# Patient Record
Sex: FEMALE | Race: WHITE | Employment: FULL TIME | URBAN - METROPOLITAN AREA
[De-identification: names, ages, dates, MRNs, and addresses within clinical notes are randomized per-mention and may not be internally consistent; named-entity substitution may affect disease eponyms.]

---

## 2017-01-05 ENCOUNTER — OFFICE VISIT (OUTPATIENT)
Dept: PHYSICAL THERAPY | Age: 57
End: 2017-01-05
Attending: FAMILY MEDICINE
Payer: COMMERCIAL

## 2017-01-05 ENCOUNTER — OFFICE VISIT (OUTPATIENT)
Dept: NUTRITION/OBESITY MEDICINE | Facility: HOSPITAL | Age: 57
End: 2017-01-05
Attending: INTERNAL MEDICINE

## 2017-01-05 VITALS
BODY MASS INDEX: 41.2 KG/M2 | DIASTOLIC BLOOD PRESSURE: 71 MMHG | WEIGHT: 247.31 LBS | HEART RATE: 99 BPM | RESPIRATION RATE: 18 BRPM | OXYGEN SATURATION: 94 % | HEIGHT: 64.8 IN | SYSTOLIC BLOOD PRESSURE: 154 MMHG

## 2017-01-05 DIAGNOSIS — E55.9 VITAMIN D DEFICIENCY: ICD-10-CM

## 2017-01-05 DIAGNOSIS — E66.01 MORBID OBESITY WITH BMI OF 45.0-49.9, ADULT (HCC): ICD-10-CM

## 2017-01-05 DIAGNOSIS — R06.83 SNORING: ICD-10-CM

## 2017-01-05 DIAGNOSIS — R73.03 PRE-DIABETES: ICD-10-CM

## 2017-01-05 DIAGNOSIS — M16.11 OSTEOARTHRITIS OF RIGHT HIP: Primary | ICD-10-CM

## 2017-01-05 DIAGNOSIS — Z51.81 ENCOUNTER FOR THERAPEUTIC DRUG MONITORING: ICD-10-CM

## 2017-01-05 DIAGNOSIS — R63.2 BINGE EATING: ICD-10-CM

## 2017-01-05 DIAGNOSIS — I10 HTN (HYPERTENSION), BENIGN: Primary | ICD-10-CM

## 2017-01-05 PROCEDURE — 99214 OFFICE O/P EST MOD 30 MIN: CPT | Performed by: INTERNAL MEDICINE

## 2017-01-05 PROCEDURE — 97110 THERAPEUTIC EXERCISES: CPT

## 2017-01-05 PROCEDURE — 97163 PT EVAL HIGH COMPLEX 45 MIN: CPT

## 2017-01-05 RX ORDER — POTASSIUM CHLORIDE 750 MG/1
10 TABLET, FILM COATED, EXTENDED RELEASE ORAL
Refills: 4 | COMMUNITY
Start: 2016-12-15 | End: 2019-10-24

## 2017-01-05 RX ORDER — HYDROCHLOROTHIAZIDE 25 MG/1
25 TABLET ORAL DAILY
Qty: 30 TABLET | Refills: 1 | Status: SHIPPED | OUTPATIENT
Start: 2017-01-05

## 2017-01-05 NOTE — PROGRESS NOTES
[facility]  300 [facility] BAR[facility]ATRIC AND [facility]INIC  [address] [place] [address]  Dept: [phone]     Date:   2016    Patient:  Sangeetha Nation  :      10/10/1960  MRN:      [id number]    Chief Complaint:  Patient presents with:  Zeus Davenport MG Oral Cap Take 1 capsule (100 mg total) by mouth 2 (two) times daily. Disp: 60 capsule Rfl: 0   Ferrous Sulfate 325 (65 FE) MG Oral Tab Take 1 tablet (325 mg total) by mouth daily.  Disp: 30 tablet Rfl: 0   rivaroxaban 10 MG Oral Tab Take 1 tablet (10 mg · Patient has a Food Journal?: yes   · Patient is reading nutrition labels? yes  · Average Caloric Intake:     · Average CHO Intake: 100  · Is patient exercising? no  · Type of exercise?      Eating Habits  · Patient states the following:  · Eats 3 meal( normal. No rashes or lesions   Back: limited movement due to pain    ASSESSMENT     HYPERTENSION:  The patient's blood pressure has been well controlled. she has been checking it as instructed and has remained in relatively good control.       Encounter Ida Fontenot MD

## 2017-01-06 NOTE — PROGRESS NOTES
POST-OP HIP EVALUATION:   Referring Physician: Dr. Adams Current  Diagnosis: Osteoarthritis of right hip (M16.11) s/p R TRENT  12/19/16 Date of Service: 1/5/2017     PATIENT SUMMARY   Teodoro Rhoades is a 55year old y/o female who presents to therapy today s/p R TRENT w to light touch. Leg length: R: 94 cm L: 93.25 cm    AROM:   Hip   Flexion: R 90 deg; L WNL  Abduction: R 30 deg;     PROM:   Hip   Flexion: R NT >90 deg due to precautions;    Abduction: R 35 deg;      Flexibility:   Hamstrings: R NT;   Piriformis: R NT of care. Thank you for your referral. Please co-sign or sign and return this letter via fax as soon as possible to [phone].  If you have any questions, please contact me at Dept: [phone]    Sincerely,  Electronically signed by therapist: Karina Can,

## 2017-01-09 ENCOUNTER — OFFICE VISIT (OUTPATIENT)
Dept: PHYSICAL THERAPY | Age: 57
End: 2017-01-09
Attending: FAMILY MEDICINE
Payer: COMMERCIAL

## 2017-01-09 PROCEDURE — 97110 THERAPEUTIC EXERCISES: CPT

## 2017-01-09 NOTE — PROGRESS NOTES
Dx: Osteoarthritis of right hip (M16.11) s/p R TRENT  12/19/16          Authorized # of Visits:           Next MD visit: none scheduled  Fall Risk: standard         Precautions: Anterior hip precautions             Subjective: No new problems.   Reports that TRX: squat x10         Shuttle leg press:   bilat LE   5 bands 3x10    R SL   4 bands  3x10                                    CP x 10 min             Charges: Ex 3       Total Timed Treatment: 45 min  Total Treatment Time: 45 min

## 2017-01-12 ENCOUNTER — APPOINTMENT (OUTPATIENT)
Dept: PHYSICAL THERAPY | Age: 57
End: 2017-01-12
Attending: FAMILY MEDICINE
Payer: COMMERCIAL

## 2017-01-13 ENCOUNTER — OFFICE VISIT (OUTPATIENT)
Dept: PHYSICAL THERAPY | Age: 57
End: 2017-01-13
Attending: FAMILY MEDICINE
Payer: COMMERCIAL

## 2017-01-13 PROCEDURE — 97110 THERAPEUTIC EXERCISES: CPT

## 2017-01-13 NOTE — PROGRESS NOTES
Dx: Osteoarthritis of right hip (M16.11) s/p R TRENT  12/19/16          Authorized # of Visits:           Next MD visit: none scheduled  Fall Risk: standard         Precautions: Anterior hip precautions             Subjective: No new problems.   Reports muscl Tiltboard AP/PA, M/L x10 ea Tiltboard AP/PA, M/L x10 ea        R SLB 6 sec max R SLB 12 sec max        Lateral walking YTB 2x35 ft Lateral walking YTB 2x35 ft        Sit<> stand x10 Sit<> stand x10        TRX: squat x10 TRX: squat x10        Shuttle leg

## 2017-01-16 ENCOUNTER — OFFICE VISIT (OUTPATIENT)
Dept: PHYSICAL THERAPY | Age: 57
End: 2017-01-16
Attending: FAMILY MEDICINE
Payer: COMMERCIAL

## 2017-01-16 PROCEDURE — 97110 THERAPEUTIC EXERCISES: CPT

## 2017-01-16 NOTE — PROGRESS NOTES
Dx: Osteoarthritis of right hip (M16.11) s/p R TRENT  12/19/16          Authorized # of Visits:           Next MD visit: 2/3/17  Fall Risk: standard         Precautions: Anterior hip precautions             Subjective: No new problems.   Reports that she felt LSU R LE lead 6\" step x15 ea ASU, LSU R LE lead 6\" step 2x10 ea       Standing SLR with YTB into flex, abd x10 ea Standing SLR with YTB into flex, abd x10 ea Standing SLR with RTB into flex, abd x10 ea       Tiltboard AP/PA, M/L x10 ea Tiltboard AP/PA, M

## 2017-01-19 ENCOUNTER — OFFICE VISIT (OUTPATIENT)
Dept: PHYSICAL THERAPY | Age: 57
End: 2017-01-19
Attending: FAMILY MEDICINE
Payer: COMMERCIAL

## 2017-01-19 PROCEDURE — 97110 THERAPEUTIC EXERCISES: CPT

## 2017-01-19 NOTE — PROGRESS NOTES
Dx: Osteoarthritis of right hip (M16.11) s/p R TRENT  12/19/16          Authorized # of Visits:           Next MD visit: 2/3/17  Fall Risk: standard         Precautions: Anterior hip precautions             Subjective: No new problems.   Reports she continues minisquats x15 minisquats x15      ASU, LSU R LE lead 6\" step x10 ea ASU, LSU R LE lead 6\" step x15 ea ASU, LSU R LE lead 6\" step 2x10 ea ASU, LSU R LE lead 8\" step 2x10 ea      Standing SLR with YTB into flex, abd x10 ea Standing SLR with YTB into fle

## 2017-01-23 ENCOUNTER — APPOINTMENT (OUTPATIENT)
Dept: PHYSICAL THERAPY | Age: 57
End: 2017-01-23
Attending: FAMILY MEDICINE
Payer: COMMERCIAL

## 2017-01-24 ENCOUNTER — OFFICE VISIT (OUTPATIENT)
Dept: PHYSICAL THERAPY | Age: 57
End: 2017-01-24
Attending: FAMILY MEDICINE
Payer: COMMERCIAL

## 2017-01-24 PROCEDURE — 97110 THERAPEUTIC EXERCISES: CPT

## 2017-01-24 NOTE — PROGRESS NOTES
Dx: Osteoarthritis of right hip (M16.11) s/p R TRENT  12/19/16          Authorized # of Visits:           Next MD visit: 2/3/17  Fall Risk: standard         Precautions: Anterior hip precautions             Subjective: No new problems.   Reports she continues bilat gastroc stretches 3x30 sec     bilat HR x15 bilat HR x20 bilat HR x20 bilat HR x20 bilat HR x20     minisquats x10 minisquats x12 minisquats x15 minisquats x15 minisquats  2x10     ASU, LSU R LE lead 6\" step x10 ea ASU, LSU R LE lead 6\" step x15 ea

## 2017-01-26 ENCOUNTER — OFFICE VISIT (OUTPATIENT)
Dept: PHYSICAL THERAPY | Age: 57
End: 2017-01-26
Attending: FAMILY MEDICINE
Payer: COMMERCIAL

## 2017-01-26 PROCEDURE — 97110 THERAPEUTIC EXERCISES: CPT

## 2017-01-26 NOTE — PROGRESS NOTES
Dx: Osteoarthritis of right hip (M16.11) s/p R TRENT  12/19/16          Authorized # of Visits:           Next MD visit: 2/3/17  Fall Risk: standard         Precautions: Anterior hip precautions             Subjective: No new problems.   Reports she continues bilat HR x20 bilat HR x20 bilat HR x20 bilat HR x20 bilat HR x20    minisquats x10 minisquats x12 minisquats x15 minisquats x15 minisquats  2x10 minisquats  2x10    ASU, LSU R LE lead 6\" step x10 ea ASU, LSU R LE lead 6\" step x15 ea ASU, LSU R LE lead 6\

## 2017-01-30 ENCOUNTER — OFFICE VISIT (OUTPATIENT)
Dept: PHYSICAL THERAPY | Age: 57
End: 2017-01-30
Attending: FAMILY MEDICINE
Payer: COMMERCIAL

## 2017-01-30 PROCEDURE — 97110 THERAPEUTIC EXERCISES: CPT

## 2017-01-30 NOTE — PROGRESS NOTES
Dx: Osteoarthritis of right hip (M16.11) s/p R TRENT  12/19/16          Authorized # of Visits:           Next MD visit: 2/3/17  Fall Risk: standard         Precautions: Anterior hip precautions             Subjective: No new problems.   Reports she continues R SLB 18 sec max   bilat gastroc stretches 3x30 sec bilat gastroc stretches 3x30 sec bilat gastroc stretches 3x30 sec bilat gastroc stretches 3x30 sec bilat gastroc stretches 3x30 sec bilat gastroc stretches 3x30 sec bilat gastroc stretches 3x30 sec   bila 3x10    R SL   3 bands  3x10 Shuttle leg press:   bilat LE   6 bands 3x10    R SL   3 bands  3x10 Shuttle leg press:   bilat LE   6 bands 3x10    R SL   4 bands  3x10 Shuttle leg press:   bilat LE   6 bands 3x10    R SL   4 bands  3x10 Shuttle leg press:

## 2017-02-02 ENCOUNTER — OFFICE VISIT (OUTPATIENT)
Dept: PHYSICAL THERAPY | Age: 57
End: 2017-02-02
Attending: FAMILY MEDICINE
Payer: COMMERCIAL

## 2017-02-02 PROCEDURE — 97110 THERAPEUTIC EXERCISES: CPT

## 2017-02-02 NOTE — PROGRESS NOTES
Discharge Summary    Pt has attended 9, cancelled 0, and no shown 0 visits in Physical Therapy. Caroline Bai reports feeling 100% improvement in pain and function since surgery and starting therapy. She demonstrates WNL strength in her right hip.  She reports ab 1/13/2017  Tx#: 3/ Date: 1/16/2017  Tx#: 4/ Date: 1/19/2017  Tx#: 5/ Date: 1/24/2017  Tx#: 6/ Date: 1/26/2017  Tx#: 7/ Date: 1/30/2017  Tx#: 8/ Date: 2/2/2017  Tx# 9   Nu step L5 x6 min Nu step L6 x8 min Nu step L7 x8 min Nu step L7 x8 min Nu step L7 x10 m RTB 2x35 ft Lateral walking RTB 2x35 ft Lateral walking, monster walking RTB 2x35 ft Lateral walking, monster walking RTB 2x35 ft Lateral walking, monster walking RTB 2x35 ft   Sit<> stand x10 Sit<> stand x10 Sit<> stand x15 Sit<> stand x15 Sit<> stand 2x1

## 2017-03-16 ENCOUNTER — APPOINTMENT (OUTPATIENT)
Dept: SURGERY | Facility: CLINIC | Age: 57
End: 2017-03-16
Attending: INTERNAL MEDICINE

## 2017-05-06 ENCOUNTER — LAB ENCOUNTER (OUTPATIENT)
Dept: LAB | Facility: HOSPITAL | Age: 57
End: 2017-05-06
Attending: FAMILY MEDICINE
Payer: COMMERCIAL

## 2017-05-06 DIAGNOSIS — R79.9 ABNORMAL BLOOD CHEMISTRY: ICD-10-CM

## 2017-05-06 DIAGNOSIS — I10 ESSENTIAL HYPERTENSION, MALIGNANT: Primary | ICD-10-CM

## 2017-05-06 DIAGNOSIS — R31.21 ASYMPTOMATIC MICROSCOPIC HEMATURIA: ICD-10-CM

## 2017-05-06 PROCEDURE — 80048 BASIC METABOLIC PNL TOTAL CA: CPT

## 2017-05-06 PROCEDURE — 36415 COLL VENOUS BLD VENIPUNCTURE: CPT

## 2017-05-06 PROCEDURE — 81001 URINALYSIS AUTO W/SCOPE: CPT

## 2017-05-06 PROCEDURE — 83036 HEMOGLOBIN GLYCOSYLATED A1C: CPT

## 2018-03-14 ENCOUNTER — HOSPITAL ENCOUNTER (OUTPATIENT)
Age: 58
Discharge: HOME | End: 2018-03-14
Payer: COMMERCIAL

## 2018-03-14 VITALS
SYSTOLIC BLOOD PRESSURE: 189 MMHG | HEIGHT: 65 IN | OXYGEN SATURATION: 98 % | DIASTOLIC BLOOD PRESSURE: 73 MMHG | BODY MASS INDEX: 43.99 KG/M2 | RESPIRATION RATE: 16 BRPM | WEIGHT: 264 LBS | TEMPERATURE: 98 F | HEART RATE: 83 BPM

## 2018-03-14 DIAGNOSIS — R68.89 FLU-LIKE SYMPTOMS: Primary | ICD-10-CM

## 2018-03-14 PROCEDURE — 99213 OFFICE O/P EST LOW 20 MIN: CPT

## 2018-03-14 PROCEDURE — 99212 OFFICE O/P EST SF 10 MIN: CPT

## 2018-03-14 NOTE — ED INITIAL ASSESSMENT (HPI)
Pt with c/o cough and congestion. Pt with body aches and fatigue. Unknown fevers. Started yesterday. Pt states family at home dx with flu.   Pt didn't take bp meds today

## 2018-03-14 NOTE — ED PROVIDER NOTES
Roberto Boles is a 65year old female who presents with for chief complaint of fever, chills, nasal congestion, coryza, rhinorrhea, sore throat, cough, headache, myalgias, fatigue, malaise, tiredness along with nausea,  Onset of symptoms was yesterday.   Ab and tongue normal; teeth and gums normal. Buccal mucosa moist.   Neck: no adenopathy  Lungs: clear to auscultation bilaterally. No chest wall retractions. No respiratory distress.  No tachypnea noted  Heart: S1, S2 normal, no murmur, click, rub or gallop, r

## 2018-06-30 ENCOUNTER — OFFICE VISIT (OUTPATIENT)
Dept: FAMILY MEDICINE CLINIC | Facility: CLINIC | Age: 58
End: 2018-06-30

## 2018-06-30 VITALS
OXYGEN SATURATION: 98 % | RESPIRATION RATE: 14 BRPM | BODY MASS INDEX: 40.98 KG/M2 | SYSTOLIC BLOOD PRESSURE: 138 MMHG | HEART RATE: 92 BPM | WEIGHT: 255 LBS | DIASTOLIC BLOOD PRESSURE: 79 MMHG | TEMPERATURE: 100 F | HEIGHT: 66 IN

## 2018-06-30 DIAGNOSIS — J01.90 ACUTE SINUSITIS, RECURRENCE NOT SPECIFIED, UNSPECIFIED LOCATION: Primary | ICD-10-CM

## 2018-06-30 DIAGNOSIS — R05.9 COUGH: ICD-10-CM

## 2018-06-30 PROCEDURE — 99213 OFFICE O/P EST LOW 20 MIN: CPT | Performed by: NURSE PRACTITIONER

## 2018-06-30 RX ORDER — AZITHROMYCIN 250 MG/1
TABLET, FILM COATED ORAL
Qty: 6 TABLET | Refills: 0 | Status: SHIPPED | OUTPATIENT
Start: 2018-06-30 | End: 2018-11-20

## 2018-06-30 NOTE — PROGRESS NOTES
CHIEF COMPLAINT:   Patient presents with:  URI      HPI:   Kalie Lucas is a 65year old female who presents with URI symptoms for 3-4 days. Onset was quick. Symptoms are progressing.  Location is reported as mid face and upper chest. Description is report • Lower extremity edema    • Morbid obesity with BMI of 45.0-49.9, adult (HCC)    • Osteoarthritis    • PONV (postoperative nausea and vomiting)     n&v with pain meds   • Pre-diabetes    • [facility]   • Visual impairment     glasses /79   Pulse 92   Temp 100.4 °F (38 °C)   Resp 14   Ht 66\"   Wt 255 lb   SpO2 98%   BMI 41.16 kg/m²   GENERAL: well developed, well nourished,in no apparent distress  SKIN: no rashes,no suspicious lesions  HEAD: atraumatic, normocephalic, mild tender Signed Prescriptions Disp Refills    azithromycin 250 MG Oral Tab 6 tablet 0      Sig: Take 2 tabs PO on Day 1, then 1 tab PO daily for days 2 through 5,         Risks, benefits, side effects of medication addressed and explained to patient.   Discussed wit The doctor may take a sample of mucus to check for bacteria. If you have sinusitis that keeps coming back, you may need imaging tests such as X-rays or CAT scans. This will help your doctor check for a structural problem that may be causing the infection. · To breathe out, open your mouth and make a “england” sound in your throat. (This is the same way you might breathe to clean a pair of eyeglasses.)  · [place] 2 to 3 times as you breathe out. · Relax for a few seconds. Repeat the steps as needed.   Follow-up c

## 2018-06-30 NOTE — PATIENT INSTRUCTIONS
Acute Sinusitis    Acute sinusitis is irritation and swelling of the sinuses. It is usually caused by a viral infection after a common cold. Your doctor can help you find relief. What is acute sinusitis?   Sinuses are air-filled spaces in the skull behin © 7119-5236 [employer] [address]. [address] [facility], [facility] [place]. All rights reserved. This information is not intended as a substitute for professional medical care. Always follow your healthcare professional's instructions.         Yue © 7525-5323 [employer] [address]. [address] [place], [facility] [place]. All rights reserved. This information is not intended as a substitute for professional medical care. Always follow your healthcare professional's instructions.

## 2018-11-20 ENCOUNTER — HOSPITAL ENCOUNTER (OUTPATIENT)
Age: 58
Discharge: HOME | End: 2018-11-20
Attending: FAMILY MEDICINE
Payer: COMMERCIAL

## 2018-11-20 VITALS
WEIGHT: 235 LBS | HEART RATE: 82 BPM | SYSTOLIC BLOOD PRESSURE: 122 MMHG | BODY MASS INDEX: 39.15 KG/M2 | HEIGHT: 65 IN | OXYGEN SATURATION: 100 % | DIASTOLIC BLOOD PRESSURE: 70 MMHG | TEMPERATURE: 97 F | RESPIRATION RATE: 16 BRPM

## 2018-11-20 DIAGNOSIS — N61.0 CELLULITIS OF RIGHT BREAST: Primary | ICD-10-CM

## 2018-11-20 PROCEDURE — 99213 OFFICE O/P EST LOW 20 MIN: CPT

## 2018-11-20 PROCEDURE — 99214 OFFICE O/P EST MOD 30 MIN: CPT

## 2018-11-20 RX ORDER — SULFAMETHOXAZOLE AND TRIMETHOPRIM 800; 160 MG/1; MG/1
1 TABLET ORAL 2 TIMES DAILY
Qty: 14 TABLET | Refills: 0 | Status: SHIPPED | OUTPATIENT
Start: 2018-11-20 | End: 2018-11-27

## 2018-11-20 NOTE — ED PROVIDER NOTES
Patient Seen in: [address] [facility]    History   Patient presents with:  Rash Skin Problem (integumentary)    Stated Complaint: rt breast pain    HPI  64 yo F - non-compliant with her mammograms - last one was in 2011 and this was normal - n to presenting problem. Social History    Tobacco Use      Smoking status: Never Smoker      Smokeless tobacco: Never Used    Alcohol use:  Yes      Alcohol/week: 0.0 oz      Comment: 2 drinks per month    Drug use: No      Review of Systems    Positive f PMD TO SCHEDULE SCREENING MAMMOGRAM               Disposition and Plan     Clinical Impression:  Cellulitis of right breast  (primary encounter diagnosis)    Disposition:  Discharge  11/20/2018  3:06 pm    Follow-up:  Kelechi Sandoval DO  [address]

## 2018-11-20 NOTE — ED INITIAL ASSESSMENT (HPI)
Pt sts 2 weeks ago noted a red, slightly itchy, painless lump to right breast. Today the lump has \"ripped open and is draining bloody drng. \" Denies fever, or n/t to right arm.

## 2018-11-21 ENCOUNTER — HOSPITAL ENCOUNTER (OUTPATIENT)
Dept: ULTRASOUND IMAGING | Age: 58
Discharge: HOME | End: 2018-11-21
Attending: FAMILY MEDICINE
Payer: COMMERCIAL

## 2018-11-21 DIAGNOSIS — Z12.39 SPECIAL SCREENING EXAMINATION FOR NEOPLASM OF BREAST: ICD-10-CM

## 2018-11-21 PROCEDURE — 76642 ULTRASOUND BREAST LIMITED: CPT | Performed by: FAMILY MEDICINE

## 2018-12-07 ENCOUNTER — HOSPITAL ENCOUNTER (OUTPATIENT)
Dept: MAMMOGRAPHY | Age: 58
Discharge: HOME | End: 2018-12-07
Attending: FAMILY MEDICINE
Payer: COMMERCIAL

## 2018-12-07 ENCOUNTER — HOSPITAL ENCOUNTER (OUTPATIENT)
Dept: ULTRASOUND IMAGING | Age: 58
Discharge: HOME | End: 2018-12-07
Attending: FAMILY MEDICINE
Payer: COMMERCIAL

## 2018-12-07 DIAGNOSIS — R92.8 ABNORMAL FINDINGS ON DIAGNOSTIC IMAGING OF BREAST: ICD-10-CM

## 2018-12-07 PROCEDURE — 76642 ULTRASOUND BREAST LIMITED: CPT | Performed by: FAMILY MEDICINE

## 2018-12-07 PROCEDURE — 77066 DX MAMMO INCL CAD BI: CPT | Performed by: FAMILY MEDICINE

## 2018-12-07 PROCEDURE — 77062 BREAST TOMOSYNTHESIS BI: CPT | Performed by: FAMILY MEDICINE

## 2019-04-17 ENCOUNTER — OFFICE VISIT (OUTPATIENT)
Dept: FAMILY MEDICINE CLINIC | Facility: CLINIC | Age: 59
End: 2019-04-17
Payer: COMMERCIAL

## 2019-04-17 VITALS
HEART RATE: 92 BPM | DIASTOLIC BLOOD PRESSURE: 80 MMHG | RESPIRATION RATE: 16 BRPM | TEMPERATURE: 98 F | SYSTOLIC BLOOD PRESSURE: 150 MMHG | OXYGEN SATURATION: 99 %

## 2019-04-17 DIAGNOSIS — J01.90 ACUTE NON-RECURRENT SINUSITIS, UNSPECIFIED LOCATION: Primary | ICD-10-CM

## 2019-04-17 PROCEDURE — 99213 OFFICE O/P EST LOW 20 MIN: CPT | Performed by: PHYSICIAN ASSISTANT

## 2019-04-17 RX ORDER — FLUTICASONE PROPIONATE 50 MCG
2 SPRAY, SUSPENSION (ML) NASAL DAILY
Qty: 1 INHALER | Refills: 0 | Status: SHIPPED | OUTPATIENT
Start: 2019-04-17

## 2019-04-17 RX ORDER — AMOXICILLIN AND CLAVULANATE POTASSIUM 875; 125 MG/1; MG/1
1 TABLET, FILM COATED ORAL 2 TIMES DAILY
Qty: 20 TABLET | Refills: 0 | Status: SHIPPED | OUTPATIENT
Start: 2019-04-17 | End: 2019-04-27

## 2019-04-17 NOTE — PROGRESS NOTES
CHIEF COMPLAINT:   Patient presents with:  Sinus Problem      HPI:   Leandro Aldridge is a 63year old female who presents for sinus symptoms for  1  weeks. Symptoms have progressed and has been worsening.  Sinus congestion/pain is described as a pressure and Osteoarthritis    • PONV (postoperative nausea and vomiting)     n&v with pain meds   • Pre-diabetes    • [facility]   • Visual impairment     glasses   • Vitamin D deficiency       Past Surgical History:   Procedure Laterality Date   • exudates. No lesion on roof of mouth. NECK: supple, non-tender  LUNGS: clear to auscultation bilaterally, no wheezes or rhonchi. Breathing is non labored. CARDIO: S1S2 RRR   EXTREMITIES: no cyanosis, clubbing or edema  LYMPH:  No gross lymphadenopathy.

## 2019-08-14 ENCOUNTER — HOSPITAL ENCOUNTER (EMERGENCY)
Age: 59
Discharge: HOME | End: 2019-08-14
Attending: EMERGENCY MEDICINE
Payer: COMMERCIAL

## 2019-08-14 VITALS
RESPIRATION RATE: 18 BRPM | OXYGEN SATURATION: 98 % | DIASTOLIC BLOOD PRESSURE: 76 MMHG | BODY MASS INDEX: 44.15 KG/M2 | HEIGHT: 65 IN | HEART RATE: 96 BPM | WEIGHT: 265 LBS | SYSTOLIC BLOOD PRESSURE: 159 MMHG

## 2019-08-14 DIAGNOSIS — K64.9 HEMORRHOIDS, UNSPECIFIED HEMORRHOID TYPE: ICD-10-CM

## 2019-08-14 DIAGNOSIS — K59.00 CONSTIPATION, UNSPECIFIED CONSTIPATION TYPE: Primary | ICD-10-CM

## 2019-08-14 PROCEDURE — 82272 OCCULT BLD FECES 1-3 TESTS: CPT

## 2019-08-14 PROCEDURE — 99283 EMERGENCY DEPT VISIT LOW MDM: CPT

## 2019-08-14 RX ORDER — POLYETHYLENE GLYCOL 3350 17 G/17G
17 POWDER, FOR SOLUTION ORAL DAILY PRN
Qty: 12 EACH | Refills: 0 | Status: SHIPPED | OUTPATIENT
Start: 2019-08-14 | End: 2019-09-13

## 2019-08-14 RX ORDER — DOCUSATE SODIUM 100 MG/1
100 CAPSULE, LIQUID FILLED ORAL 2 TIMES DAILY
Qty: 60 CAPSULE | Refills: 0 | Status: SHIPPED | OUTPATIENT
Start: 2019-08-14 | End: 2019-09-13

## 2019-08-14 RX ORDER — POTASSIUM CHLORIDE 750 MG/1
10 TABLET, EXTENDED RELEASE ORAL DAILY
Refills: 3 | COMMUNITY
Start: 2019-04-08

## 2019-08-14 NOTE — ED INITIAL ASSESSMENT (HPI)
Pt to the ED for evaluation of constipation and possible hemorrhoid. Reports last BM Sunday. States she is unable to go due to the presence of the hemorrhoid. No OTC meds taken.

## 2019-08-14 NOTE — ED PROVIDER NOTES
Patient Seen in: [facility] [place]    History   Patient presents with:  Constipation (gastrointestinal)  Anal Problem (GI)    Stated Complaint: constipation, hemmorrhoids    HPI    The patient is a 60-year-old female with multiple replacement   • HIP TOTAL REPLACEMENT Right 12/19/2016    Performed by Bacilio Torres MD at [facility] MAIN OR   • HYSTERECTOMY  2011   • LUMBAR MICRODISCECTOMY 1 LEVEL N/A 8/26/2016    Performed by Troy Mora DO at [facility] MAIN OR   • TOTAL ABDOM HYSTERECTOMY [address], RN. There is a nontender, pink, viable external hemorrhoid noted that is mildly tender. No other external lesions. Normal rectal tone. There is a significant amount of hard brown Hemoccult negative stool within rectal vault.   Upon digital disi

## 2019-10-24 ENCOUNTER — OFFICE VISIT (OUTPATIENT)
Dept: FAMILY MEDICINE CLINIC | Facility: CLINIC | Age: 59
End: 2019-10-24
Payer: COMMERCIAL

## 2019-10-24 VITALS
TEMPERATURE: 98 F | BODY MASS INDEX: 44 KG/M2 | SYSTOLIC BLOOD PRESSURE: 138 MMHG | HEART RATE: 100 BPM | DIASTOLIC BLOOD PRESSURE: 74 MMHG | RESPIRATION RATE: 18 BRPM | OXYGEN SATURATION: 97 % | WEIGHT: 267 LBS

## 2019-10-24 DIAGNOSIS — J01.00 ACUTE MAXILLARY SINUSITIS, RECURRENCE NOT SPECIFIED: Primary | ICD-10-CM

## 2019-10-24 PROCEDURE — 99203 OFFICE O/P NEW LOW 30 MIN: CPT | Performed by: FAMILY MEDICINE

## 2019-10-24 RX ORDER — AMOXICILLIN AND CLAVULANATE POTASSIUM 875; 125 MG/1; MG/1
1 TABLET, FILM COATED ORAL 2 TIMES DAILY
Qty: 14 TABLET | Refills: 0 | Status: SHIPPED | OUTPATIENT
Start: 2019-10-24 | End: 2019-10-31

## 2019-10-24 NOTE — PROGRESS NOTES
CHIEF COMPLAINT: Patient presents with:  Sinus Problem        HPI:     Ten Crockett is a 63year old female presents for sinus problems x 3 days. Tashia Santiago is a 62 yo F with recurrent sinusitis p/w a 3-day history of sinus problems x 3 days.  She has increa Cancer Mother [de-identified]      Social History: Social History    Tobacco Use      Smoking status: Never Smoker      Smokeless tobacco: Never Used    Alcohol use:  Yes      Alcohol/week: 0.0 standard drinks      Comment: 2 drinks per month    Drug use: No       Medic Constitutional: She is oriented to person, place, and time. She appears well-developed and well-nourished. HENT:   Head: Normocephalic. Right Ear: Ear canal normal. Tympanic membrane is not erythematous.    Left Ear: Ear canal normal. Tympanic membran Cells 05/06/2017 Small    • Renal Tubular Epithelial* 05/06/2017 None Seen    • Transitional Cells 05/06/2017 None Seen    • Mucous Urine 05/06/2017 1+*   • Yeast Urine 05/06/2017 None Seen    • Glucose 05/06/2017 96    • BUN 05/06/2017 11    • Creatinine localized, lower leg     Tear of medial cartilage or meniscus of knee, current     Morbid obesity due to excess calories (HCC)     Elevated blood pressure     HTN (hypertension), benign     Fatigue     Snoring     Morbid obesity with BMI of 45.0-49.9, adul

## 2019-12-30 ENCOUNTER — HOSPITAL ENCOUNTER (OUTPATIENT)
Dept: MAMMOGRAPHY | Age: 59
Discharge: HOME | End: 2019-12-30
Attending: FAMILY MEDICINE
Payer: COMMERCIAL

## 2019-12-30 DIAGNOSIS — Z12.31 ENCOUNTER FOR SCREENING MAMMOGRAM FOR MALIGNANT NEOPLASM OF BREAST: ICD-10-CM

## 2019-12-30 PROCEDURE — 77067 SCR MAMMO BI INCL CAD: CPT | Performed by: FAMILY MEDICINE

## 2020-01-06 ENCOUNTER — HOSPITAL ENCOUNTER (OUTPATIENT)
Dept: MAMMOGRAPHY | Age: 60
Discharge: HOME | End: 2020-01-06
Attending: FAMILY MEDICINE
Payer: COMMERCIAL

## 2020-01-06 ENCOUNTER — HOSPITAL ENCOUNTER (OUTPATIENT)
Dept: ULTRASOUND IMAGING | Age: 60
Discharge: HOME | End: 2020-01-06
Attending: FAMILY MEDICINE
Payer: COMMERCIAL

## 2020-01-06 DIAGNOSIS — R92.2 INCONCLUSIVE MAMMOGRAM: ICD-10-CM

## 2020-01-06 PROCEDURE — 77062 BREAST TOMOSYNTHESIS BI: CPT | Performed by: FAMILY MEDICINE

## 2020-01-06 PROCEDURE — 77066 DX MAMMO INCL CAD BI: CPT | Performed by: FAMILY MEDICINE

## 2020-01-06 PROCEDURE — 76642 ULTRASOUND BREAST LIMITED: CPT | Performed by: FAMILY MEDICINE

## 2020-12-02 ENCOUNTER — TELEPHONE (OUTPATIENT)
Dept: INTERNAL MEDICINE CLINIC | Facility: HOSPITAL | Age: 60
End: 2020-12-02

## 2020-12-02 ENCOUNTER — LAB ENCOUNTER (OUTPATIENT)
Dept: LAB | Age: 60
End: 2020-12-02
Attending: PREVENTIVE MEDICINE

## 2020-12-02 DIAGNOSIS — Z20.822 SUSPECTED 2019 NOVEL CORONAVIRUS INFECTION: ICD-10-CM

## 2020-12-02 DIAGNOSIS — Z20.822 SUSPECTED 2019 NOVEL CORONAVIRUS INFECTION: Primary | ICD-10-CM

## 2020-12-02 NOTE — TELEPHONE ENCOUNTER
Results and RTW guidelines:    COVID RESULT GIVEN:      Test type:    [x] Rapid         []      [x] Positive   - Monitor sx and temperature    - Employee should quarantine at home for at least 10 days from sx onset, follow the CDC guidelines for c

## 2020-12-02 NOTE — TELEPHONE ENCOUNTER
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 [x] Yes    [] No   If yes, explain: 5 family memebers      NOTES: Tashia Santiago called stating the above s/s, she also states that 5 family members that reside in her home are Covid+ 12/1/2020.  Tashia Agent does state that she wo

## 2021-09-09 ENCOUNTER — TELEPHONE (OUTPATIENT)
Dept: INTERNAL MEDICINE CLINIC | Facility: HOSPITAL | Age: 61
End: 2021-09-09

## 2021-09-09 ENCOUNTER — HOSPITAL ENCOUNTER (OUTPATIENT)
Age: 61
Discharge: STILL ADMITTED OR ADMINISTRATIVE | End: 2021-09-09
Payer: COMMERCIAL

## 2021-09-09 ENCOUNTER — LAB ENCOUNTER (OUTPATIENT)
Dept: LAB | Age: 61
End: 2021-09-09
Attending: PREVENTIVE MEDICINE
Payer: COMMERCIAL

## 2021-09-09 ENCOUNTER — HOSPITAL ENCOUNTER (OUTPATIENT)
Age: 61
Discharge: HOME | End: 2021-09-09
Payer: COMMERCIAL

## 2021-09-09 VITALS
BODY MASS INDEX: 44.98 KG/M2 | TEMPERATURE: 98 F | HEART RATE: 69 BPM | OXYGEN SATURATION: 98 % | HEIGHT: 65 IN | RESPIRATION RATE: 17 BRPM | WEIGHT: 270 LBS | SYSTOLIC BLOOD PRESSURE: 151 MMHG | DIASTOLIC BLOOD PRESSURE: 65 MMHG

## 2021-09-09 DIAGNOSIS — U07.1 COVID: ICD-10-CM

## 2021-09-09 DIAGNOSIS — K08.89 PAIN, DENTAL: Primary | ICD-10-CM

## 2021-09-09 DIAGNOSIS — U07.1 COVID: Primary | ICD-10-CM

## 2021-09-09 LAB — SARS-COV-2 RNA RESP QL NAA+PROBE: NOT DETECTED

## 2021-09-09 PROCEDURE — 99213 OFFICE O/P EST LOW 20 MIN: CPT | Performed by: PHYSICIAN ASSISTANT

## 2021-09-09 RX ORDER — AMOXICILLIN 875 MG/1
875 TABLET, COATED ORAL 2 TIMES DAILY
Qty: 14 TABLET | Refills: 0 | Status: SHIPPED | OUTPATIENT
Start: 2021-09-09 | End: 2021-09-16

## 2021-09-09 NOTE — TELEPHONE ENCOUNTER
Results and RTW guidelines:    COVID RESULT:    [x] Viewed by employee in [address] RTW plan and instructions as indicated on triage call. Manager notified. Estimated RTW date: 9/9/21  [] Discussed with employee   [] Unable to reach by phone.   Sent via for new/worsening sx.   Discuss RTW guidelines with manager  [x] May continue to work  [] If test result is negative, return to work after 7 days from exposure date  [] Follow up with PCP  [] Home until further instruction from hotline with [facility] results

## 2021-09-09 NOTE — ED INITIAL ASSESSMENT (HPI)
Pt began with rt sided lower jaw/gum pain, it is now not going away and getting worse, and moving into her jaw, face and rt ear

## 2021-09-09 NOTE — ED PROVIDER NOTES
Patient Seen in: [facility] [address] [place] [address]      History   Patient presents with:  Dental Problem    Stated Complaint: pain in right jaw/ear/throat    HPI/Subjective:   HPI    52-year-old female presents to the [address] [place] [address] for evaluation of possible dental i [09/09/21 1032]   /65   Pulse 69   Resp 17   Temp 98.2 °F (36.8 °C)   Temp src Oral   SpO2 98 %   O2 Device None (Room air)       Current:/65   Pulse 69   Temp 98.2 °F (36.8 °C) (Oral)   Resp 17   Ht 165.1 cm (5' 5\")   Wt 122.5 kg   SpO2 98%

## 2021-09-09 NOTE — TELEPHONE ENCOUNTER
Department: Coorp- Coding                                 [x] [facility]  []HAROLDO   [] [facility]    Dep Manager/Supervisor/team or clinical lead: Elliot Drew    Position:  [] MD     [] RN     [] Respiratory Therapist     [] PCT     [] PSR      [x] Other     H seen as Pt for possible gum abscess. ICC instructed to call hotline for testing prior to being seen. Orders placed to expedite care in [facility] for pt. PLAN:     Is employee symptomatic? Yes-  [x] Order rapid testing.   Stay home until further instr scheduling at [phone] or use [employer] to make an appointment for their testing and remain in their vehicle when arrived at site until they are contacted for testing  [x]  May return to work if employee views negative result in [employer] and remains fever

## 2022-02-01 ENCOUNTER — NURSE ONLY (OUTPATIENT)
Dept: LAB | Age: 62
End: 2022-02-01
Attending: PREVENTIVE MEDICINE
Payer: COMMERCIAL

## 2022-02-01 ENCOUNTER — TELEPHONE (OUTPATIENT)
Dept: INTERNAL MEDICINE CLINIC | Facility: HOSPITAL | Age: 62
End: 2022-02-01

## 2022-02-01 DIAGNOSIS — Z20.822 SUSPECTED COVID-19 VIRUS INFECTION: ICD-10-CM

## 2022-02-01 LAB — SARS-COV-2 RNA RESP QL NAA+PROBE: NOT DETECTED

## 2022-02-01 NOTE — TELEPHONE ENCOUNTER
[x] [facility]  []HAROLDO   [] [facility]  Manager : Mark Thorne    HAVE YOU RECEIVED THE COVID-19 Vaccine? Yes [x]    No []          If yes, date(s) received:   4/29/21, 6/10/21, 1/14/22         Which vaccine:  Pfizer []     Corinne Chesterfield [x]    J&J []      SYMPTOMS (reported via dashboard): Runny nose  [] asymptomatic  [x] symptomatic  [] GI symptoms only  Symptom onset date: 1/31/22  Any fever, vomiting or diarrhea?:Yes []     No [x]    If yes describe:    Employee has positive COVID Exposure? Yes [x]     No []    Date of exposure:     []  Coworker                       [] patient                        [x] Family/friend    When was the last shift you worked?: 2/1/22 (works from home)    PLAN:  Maria Del Carmen's daughter tested positive on Saturday (1/29/22).  Positive exposure, mild symptoms    COVID-19 testing ordered: [x] Rapid    [] [facility]              Date test is to be taken:  2/1/22    []  Outside testing                           Notes:    INSTRUCTIONS PROVIDED:    [x]  Employee was instructed to call Central scheduling at [phone] or use [employer] to make an appointment for their testing and once at the site remain in their vehicle and call [phone] to register for the appointment  [x]  May return to work if employee views negative result in [address] and remains fever, vomiting, and diarrhea free  []  May continue to work if remains asymptomatic and views negative result in [address]  []  Follow up for condition update when resulting  []  If symptoms develop, stay home and call hotline for rapid test order  [x]  If COVID positive results, off work minimum of 5 days from positive test or onset of symptoms (day 0)    [x]      On day 5, if asymptomatic or mildly symptomatic (with improving symptoms) may return to work day 6  []      On day 5, if symptomatic, call Employee Health for RTW screening        [x]  Plan noted above  [x]  Length of time to obtain results  []  Quarantine instructions  [x]  S/S of worsening infection/condition and importance of prompt medical re-evaluation including when to seek emergency care.    [x] The employee voiced understanding

## 2022-10-21 ENCOUNTER — HOSPITAL ENCOUNTER (OUTPATIENT)
Dept: MAMMOGRAPHY | Age: 62
End: 2022-10-21
Attending: FAMILY MEDICINE
Payer: COMMERCIAL

## 2022-10-21 ENCOUNTER — HOSPITAL ENCOUNTER (OUTPATIENT)
Dept: MAMMOGRAPHY | Age: 62
Discharge: HOME | End: 2022-10-21
Attending: FAMILY MEDICINE
Payer: COMMERCIAL

## 2022-10-21 DIAGNOSIS — Z12.31 SCREENING MAMMOGRAM FOR BREAST CANCER: ICD-10-CM

## 2022-10-21 DIAGNOSIS — Z12.31 ENCOUNTER FOR SCREENING MAMMOGRAM FOR MALIGNANT NEOPLASM OF BREAST: ICD-10-CM

## 2022-10-21 PROCEDURE — 77067 SCR MAMMO BI INCL CAD: CPT | Performed by: FAMILY MEDICINE

## 2022-10-21 PROCEDURE — 77063 BREAST TOMOSYNTHESIS BI: CPT | Performed by: FAMILY MEDICINE

## 2023-11-30 ENCOUNTER — HOSPITAL ENCOUNTER (OUTPATIENT)
Age: 63
Discharge: HOME | End: 2023-11-30
Payer: COMMERCIAL

## 2023-11-30 VITALS
HEIGHT: 65 IN | BODY MASS INDEX: 44.98 KG/M2 | WEIGHT: 270 LBS | DIASTOLIC BLOOD PRESSURE: 98 MMHG | OXYGEN SATURATION: 97 % | SYSTOLIC BLOOD PRESSURE: 138 MMHG | TEMPERATURE: 98 F | HEART RATE: 95 BPM | RESPIRATION RATE: 18 BRPM

## 2023-11-30 DIAGNOSIS — U07.1 COVID: ICD-10-CM

## 2023-11-30 DIAGNOSIS — R05.9 COUGH: Primary | ICD-10-CM

## 2023-11-30 LAB
POCT INFLUENZA A: NEGATIVE
POCT INFLUENZA B: NEGATIVE
SARS-COV-2 RNA RESP QL NAA+PROBE: DETECTED

## 2023-11-30 PROCEDURE — 87502 INFLUENZA DNA AMP PROBE: CPT | Performed by: PHYSICIAN ASSISTANT

## 2023-11-30 PROCEDURE — 99213 OFFICE O/P EST LOW 20 MIN: CPT | Performed by: PHYSICIAN ASSISTANT

## 2023-11-30 PROCEDURE — U0002 COVID-19 LAB TEST NON-CDC: HCPCS | Performed by: PHYSICIAN ASSISTANT

## 2023-11-30 RX ORDER — FLUTICASONE PROPIONATE 50 MCG
2 SPRAY, SUSPENSION (ML) NASAL DAILY
Qty: 16 G | Refills: 0 | Status: SHIPPED | OUTPATIENT
Start: 2023-11-30 | End: 2023-12-30

## 2023-11-30 NOTE — ED INITIAL ASSESSMENT (HPI)
Patient started with cough and congestion- cough seems to be improving. Chills and ST in the AM. She started with symptoms Saturday.

## 2025-07-26 ENCOUNTER — HOSPITAL ENCOUNTER (OUTPATIENT)
Age: 65
Discharge: HOME | End: 2025-07-26
Payer: COMMERCIAL

## 2025-07-26 VITALS
BODY MASS INDEX: 46.65 KG/M2 | SYSTOLIC BLOOD PRESSURE: 180 MMHG | HEIGHT: 65 IN | TEMPERATURE: 99 F | HEART RATE: 73 BPM | RESPIRATION RATE: 16 BRPM | OXYGEN SATURATION: 100 % | WEIGHT: 280 LBS | DIASTOLIC BLOOD PRESSURE: 88 MMHG

## 2025-07-26 DIAGNOSIS — L03.113 CELLULITIS OF RIGHT UPPER EXTREMITY: Primary | ICD-10-CM

## 2025-07-26 DIAGNOSIS — L28.2 PRURITIC RASH: ICD-10-CM

## 2025-07-26 RX ORDER — CEFUROXIME AXETIL 500 MG/1
500 TABLET ORAL 2 TIMES DAILY
Qty: 14 TABLET | Refills: 0 | Status: SHIPPED | OUTPATIENT
Start: 2025-07-26 | End: 2025-08-02

## 2025-07-26 RX ORDER — PREDNISONE 20 MG/1
40 TABLET ORAL DAILY
Qty: 10 TABLET | Refills: 0 | Status: SHIPPED | OUTPATIENT
Start: 2025-07-26 | End: 2025-07-31

## 2025-07-26 NOTE — ED PROVIDER NOTES
Patient Seen in: [facility]        History  Chief Complaint   Patient presents with    Skin Problem     Entered by patient     Stated Complaint: Skin Problem    Subjective:   HPI     Maria Del Carmen Abbott is a 64 year old female who presents with a possible bug bite, with spreading redness on her right outer upper arm that worsened 1 day ago and a rash to right side of chest that began 1 week ago.   These rashes do not tingle or burn.  They are pruritic.     She noticed the possible bug bite last weekend, characterized by severe itching initially, which has since decreased. Pt was scratching the area.     She has not engaged in outdoor activities like hiking or camping, gardening and has not stayed in hotels recently.   She lives with others, but no one else has similar symptoms or rashes.   No diabetes, allergies to antibiotics, or reactions to bee stings or wasps.  She applied cortisone cream with minimal relief and has discontinued its use.     Pt denies fever, chills, malaise, lip/tongue swelling, shortness of breath, numbness/tingling to area.       Objective:     Past Medical History:    Arthritis    Attention deficit hyperactivity disorder (ADHD)    pt takes vyvanse for binge eating disorder    Back problem    Essential hypertension    High blood pressure    HTN (hypertension)    Lower extremity edema    Morbid obesity with BMI of 45.0-49.9, adult (HCC)    Osteoarthritis    PONV (postoperative nausea and vomiting)    n&v with pain meds    Pre-diabetes    Pulmonary embolism (HCC)    Visual impairment    glasses    Vitamin D deficiency              Past Surgical History:   Procedure Laterality Date    Appendectomy      Appendectomy      Back surgery  17    L5-S1 microdiscectomy, Dr. Ballesteros    Biopsy of stomach,by laparotomy         & 1992    x2    Hip replacement surgery      Hip surgery Right 16--Dr. Harding    total hip replacement    Hysterectomy      Total abdom hysterectomy                   Social History     Socioeconomic History    Marital status:    Tobacco Use    Smoking status: Never    Smokeless tobacco: Never   Vaping Use    Vaping status: Never Used   Substance and Sexual Activity    Alcohol use: Yes     Alcohol/week: 0.0 standard drinks of alcohol     Comment: 2 drinks per month    Drug use: No   Other Topics Concern    Caffeine Concern No    Exercise No              Review of Systems    Positive for stated complaint: Skin Problem  Other systems are as noted in HPI.  Constitutional and vital signs reviewed.      All other systems reviewed and negative except as noted above.      Physical Exam    ED Triage Vitals [07/26/25 1150]   BP (!) 180/88   Pulse 73   Resp 16   Temp 98.7 °F (37.1 °C)   Temp src Oral   SpO2 100 %   O2 Device None (Room air)       Current Vitals:   Vital Signs  BP: (!) 180/88  Pulse: 73  Resp: 16  Temp: 98.7 °F (37.1 °C)  Temp src: Oral    Oxygen Therapy  SpO2: 100 %  O2 Device: None (Room air)      Pertinent physical exam:      GENERAL: Alert, cooperative, well developed, no acute distress.  HEENT: Normocephalic, normal oropharynx, moist mucous membranes.  CHEST: Clear to auscultation bilaterally. No wheezes, rhonchi, or crackles.  CARDIOVASCULAR: Normal heart rate and rhythm. S1 and S2 normal without murmurs.  NEUROLOGICAL: Cranial nerves grossly intact. Moves all extremities without gross motor or sensory deficit.  SKIN: Erythematous, warm patch on right upper arm measuring 13x16 cm.  Right upper chest with scattered papular rash. No vesicles or pustules. No burning or tingling sensation.      ED Course  Labs Reviewed - No data to display     MDM     Assessment & Plan  Maria Del Carmen Abbott is a 64 year old female who presents with a possible bug bite, with spreading redness on her right outer upper arm that worsened 1 day ago and a rash to right side of chest that began 1 week ago.   Diff dx include cellulitis, local reaction, dermatitis, less likely  shingles.   On exam, pt is well appearing.   Will treat for cellulitis and localized reaction with Cefuroxime and Prednisone.   - Prescribed oral antibiotic for cellulitis.  - Prescribed 5-day course of oral steroids for inflammation.  - Advised to seek further medical care if condition worsens.  - pt agreeable to plan    Medical Decision Making      Disposition and Plan     Clinical Impression:  1. Cellulitis of right upper extremity    2. Pruritic rash         Disposition:  Discharge  7/26/2025 12:24 pm    Follow-up:  Juanita Blevins DO  [facility] [address] [id number] [phone]      If symptoms worsen          Medications Prescribed:  Discharge Medication List as of 7/26/2025 12:24 PM        START taking these medications    Details   predniSONE 20 MG Oral Tab Take 2 tablets (40 mg total) by mouth daily for 5 days., Normal, Disp-10 tablet, R-0      cefuroxime 500 MG Oral Tab Take 1 tablet (500 mg total) by mouth 2 (two) times daily for 7 days., Normal, Disp-14 tablet, R-0                   Supplementary Documentation:

## 2025-07-26 NOTE — ED INITIAL ASSESSMENT (HPI)
Patient here with c/o possible cellulitis on the back of her right arm. Site is red, inflamed, and warm to the touch. Thinks she had a bug bite last week around that area. Also reports a \"rash\" on the left side of her chest. States it's itchy. No recent fevers.

## 2025-07-26 NOTE — DISCHARGE INSTRUCTIONS
Start Cefuroxime: take 1 tab twice a day for 7 days.     Prednisone: take 2 tabs in the AM with breakfast x 5 days.     Avoid scratching the area.  Cool compress or washcloth if itching.     Hot showers will make itching worse temporarily. Pat dry.     If any worsening of symptoms seek further medical care.

## (undated) DIAGNOSIS — Z20.822 SUSPECTED COVID-19 VIRUS INFECTION: Primary | ICD-10-CM

## (undated) NOTE — LETTER
Date & Time: 11/30/2023, 5:07 PM  Patient: Sofia Stallings  Encounter Provider(s):    RENITA Thompson       To Whom It May Concern:    Kahlil Lowe was seen and treated in our department on 11/30/2023. She  tested positive for COVID and due to acute active infection unable to obtain the influenza vaccine until asymptomatic.  .    If you have any questions or concerns, please do not hesitate to call.        _____________________________  Physician/APC Signature

## (undated) NOTE — MR AVS SNAPSHOT
19 [facility] 66, [facility] 34 [facility]               Thank you for choosing us for your health care visit with Luiza Alvarez PT.   We are glad to serve you and happy to provide you with this summary of your Take 1 capsule (100 mg total) by mouth 2 (two) times daily. Commonly known as:  COLACE           Ferrous Sulfate 325 (65 FE) MG Tabs   Take 1 tablet (325 mg total) by mouth daily.            gabapentin 100 MG Caps   Take 1 capsule (100 mg total) by mouth

## (undated) NOTE — MR AVS SNAPSHOT
19 [facility] 66, [facility] 34 [facility]               Thank you for choosing us for your health care visit with Ronnette Fabry, PT.   We are glad to serve you and happy to provide you with this summary of your Take 1 capsule (100 mg total) by mouth 2 (two) times daily. Commonly known as:  COLACE           Ferrous Sulfate 325 (65 FE) MG Tabs   Take 1 tablet (325 mg total) by mouth daily.            gabapentin 100 MG Caps   Take 1 capsule (100 mg total) by mouth

## (undated) NOTE — MR AVS SNAPSHOT
19 [facility] 66, [facility] 34 [facility]               Thank you for choosing us for your health care visit with Silvia Charlton PT.   We are glad to serve you and happy to provide you with this summary of your med list.                docusate sodium 100 MG Caps   Take 1 capsule (100 mg total) by mouth 2 (two) times daily. Commonly known as:  COLACE           Ferrous Sulfate 325 (65 FE) MG Tabs   Take 1 tablet (325 mg total) by mouth daily.            gabapenti You can get these medications from any pharmacy     Bring a paper prescription for each of these medications    - Lisdexamfetamine Dimesylate 40 MG Caps            MyChart     Visit MyChart  You can access your MyChart to more actively manage your health c

## (undated) NOTE — MR AVS SNAPSHOT
19 [facility] 66, [facility] 34 [facility]               Thank you for choosing us for your health care visit with Sylvia Steele PT.   We are glad to serve you and happy to provide you with this summary of your Take 1 capsule (100 mg total) by mouth 2 (two) times daily. Commonly known as:  COLACE           Ferrous Sulfate 325 (65 FE) MG Tabs   Take 1 tablet (325 mg total) by mouth daily.            gabapentin 100 MG Caps   Take 1 capsule (100 mg total) by mouth

## (undated) NOTE — ED AVS SNAPSHOT
Thanh Andrade   MRN: [id number]    Department:  THE [facility] Emergency Department in [place]   Date of Visit:  8/14/2019           Disclosure     Insurance plans vary and the physician(s) referred by the ER may not be covered by your plan.  Please contact tell this physician (or your personal doctor if your instructions are to return to your personal doctor) about any new or lasting problems. The primary care or specialist physician will see patients referred from the [facility] Emergency Department.  Cheryle Levins

## (undated) NOTE — MR AVS SNAPSHOT
19 [facility] 66, [facility] 34 [facility]               Thank you for choosing us for your health care visit with Chris Houston PT.   We are glad to serve you and happy to provide you with this summary of your Take 1 capsule (100 mg total) by mouth 2 (two) times daily. Commonly known as:  COLACE           Ferrous Sulfate 325 (65 FE) MG Tabs   Take 1 tablet (325 mg total) by mouth daily.            gabapentin 100 MG Caps   Take 1 capsule (100 mg total) by mouth

## (undated) NOTE — MR AVS SNAPSHOT
19 [facility] 66, [facility] 34 [facility]               Thank you for choosing us for your health care visit with Aleyda Silvestre PT.   We are glad to serve you and happy to provide you with this summary of your oxyCODONE-acetaminophen 5-325 MG Tabs   Take 1 tablet by mouth every 4 (four) hours as needed. Commonly known as:  PERCOCET           Potassium Chloride ER 10 MEQ Tbcr   Take 10 mEq by mouth once daily.    Commonly known as:  K-DUR           rivaroxaban

## (undated) NOTE — MR AVS SNAPSHOT
19 [facility] 66, [facility] 34 [facility]               Thank you for choosing us for your health care visit with Linton Moritz, PT.   We are glad to serve you and happy to provide you with this summary of your Take 1 capsule (100 mg total) by mouth 2 (two) times daily. Commonly known as:  COLACE           Ferrous Sulfate 325 (65 FE) MG Tabs   Take 1 tablet (325 mg total) by mouth daily.            gabapentin 100 MG Caps   Take 1 capsule (100 mg total) by mouth

## (undated) NOTE — MR AVS SNAPSHOT
19 [facility] 66, [facility] 34 [facility]               Thank you for choosing us for your health care visit with Sylvia Steele PT.   We are glad to serve you and happy to provide you with this summary of your Take 1 capsule (100 mg total) by mouth 3 (three) times daily. What changed:  how much to take   Commonly known as:  NEURONTIN           hydrochlorothiazide 25 MG Tabs   Take 1 tablet (25 mg total) by mouth daily.    Commonly known as:  HYDRODIURIL

## (undated) NOTE — LETTER
Date & Time: 11/30/2023, 4:29 PM  Patient: Freida Clay  Encounter Provider(s):    RENITA Meyer       To Whom It May Concern:    Bonilla Bennett was seen and treated in our department on 11/30/2023. She should not return to work until 12/04/23 .     If you have any questions or concerns, please do not hesitate to call.        _____________________________  Physician/APC Signature

## (undated) NOTE — LETTER
Date: 10/24/2019    Patient Name: Laron Holstein          To Whom it may concern: This letter has been written at the patient's request. The above patient was seen at the [facility] for treatment of a medical condition.     This patient junioru

## (undated) NOTE — Clinical Note
Patient Name: Jose Maria Sharma  YOB: 1960          MRN number:  [id number]  Date:  1/5/2017  Referring Physician:  Aashish Hernandez     POST-OP HIP EVALUATION:    Referring Physician: Dr. Jose Jaeger: Osteoarthritis of right hip (M16.11) s/p R TRENT  12 Observation/Skin: incision clean, no drainage. Staples intact  Palpation: + tenderness noted on palpation of glut med, TFL, ITB  Sensation: Sensation intact to light touch.   Leg length: R: 94 cm L: 93.25 cm    AROM:   Hip   Flexion: R 90 deg; L WNL  Abduct Thank you for your referral. Please co-sign or sign and return this letter via fax as soon as possible to [phone].  If you have any questions, please contact me at Dept: [phone]    Sincerely, Electronically signed by therapist: Marzena Palacios PT

## (undated) NOTE — MR AVS SNAPSHOT
[address] [facility]  [address]                Thank you for choosing us for your health care visit with Maria Fernanda Garcia MD.  We are glad to serve you and happy to provide you with th Codeine Camsylate [Codeine] Nausea only                Today's Vital Signs     BP Pulse Height Weight BMI Breastfeeding?     154/71 mmHg 99 64.8\" 247 lb 4.8 oz (112.175 kg) 41.40 kg/m2 No         Current Medications          This list is accurate as of: 1 Where to Get Your Medications      These medications were sent to [place], [address] [facility], [address], [address] [phone], [phone]  [address] [facility], [facility], [address]     Phone:  [phone]    -

## (undated) NOTE — MR AVS SNAPSHOT
19 [facility] 66, [facility] 34 [facility]               Thank you for choosing us for your health care visit with Burnetta Heimlich, PT.   We are glad to serve you and happy to provide you with this summary of your Commonly known as:  HYDRODIURIL           Lisdexamfetamine Dimesylate 40 MG Caps   Take 1 capsule (40 mg total) by mouth every morning.    Commonly known as:  VYVANSE   Start taking on:  2/5/2017           oxyCODONE-acetaminophen 5-325 MG Tabs   Take 1 tabl

## (undated) NOTE — LETTER
[facility] CARE IN [place]  [address] [address]  Dept: [phone]  Dept Fax: [phone]      March 14, 2018    Patient: Giana Bunch   Date of Visit: 3/14/2018       To Whom It May Concern:    Watson Cedillo was seen and treated in